# Patient Record
Sex: MALE | Race: WHITE
[De-identification: names, ages, dates, MRNs, and addresses within clinical notes are randomized per-mention and may not be internally consistent; named-entity substitution may affect disease eponyms.]

---

## 2019-08-06 ENCOUNTER — HOSPITAL ENCOUNTER (EMERGENCY)
Dept: HOSPITAL 95 - ER | Age: 54
Discharge: HOME | End: 2019-08-06
Payer: COMMERCIAL

## 2019-08-06 VITALS — WEIGHT: 304 LBS | HEIGHT: 71 IN | BODY MASS INDEX: 42.56 KG/M2

## 2019-08-06 DIAGNOSIS — R07.9: Primary | ICD-10-CM

## 2019-08-06 DIAGNOSIS — Z79.899: ICD-10-CM

## 2019-08-06 DIAGNOSIS — Z79.4: ICD-10-CM

## 2019-08-06 DIAGNOSIS — E11.9: ICD-10-CM

## 2019-08-06 DIAGNOSIS — Z87.891: ICD-10-CM

## 2019-08-06 DIAGNOSIS — Z79.82: ICD-10-CM

## 2019-08-06 LAB
ALBUMIN SERPL BCP-MCNC: 3.6 G/DL
ALBUMIN/GLOB SERPL: 1.1 {RATIO}
ALT SERPL W P-5'-P-CCNC: 41 U/L
ANION GAP SERPL CALCULATED.4IONS-SCNC: 9 MMOL/L
AST SERPL W P-5'-P-CCNC: 23 U/L
BASOPHILS # BLD AUTO: 0.09 K/MM3
BASOPHILS NFR BLD AUTO: 1 %
BILIRUB SERPL-MCNC: 0.7 MG/DL
BUN SERPL-MCNC: 29 MG/DL
CALCIUM SERPL-MCNC: 9.2 MG/DL
CHLORIDE SERPL-SCNC: 108 MMOL/L
CO2 SERPL-SCNC: 22 MMOL/L
CREAT SERPL-MCNC: 1.16 MG/DL
DEPRECATED RDW RBC AUTO: 41.4 FL
EOSINOPHIL # BLD AUTO: 0.36 K/MM3
EOSINOPHIL NFR BLD AUTO: 4 %
ERYTHROCYTE [DISTWIDTH] IN BLOOD BY AUTOMATED COUNT: 13 %
GLOBULIN SER CALC-MCNC: 3.4 G/DL
GLUCOSE SERPL-MCNC: 155 MG/DL
HCT VFR BLD AUTO: 41.2 %
HGB BLD-MCNC: 13.4 G/DL
IMM GRANULOCYTES # BLD AUTO: 0.16 K/MM3
IMM GRANULOCYTES NFR BLD AUTO: 2 %
LYMPHOCYTES # BLD AUTO: 2.57 K/MM3
LYMPHOCYTES NFR BLD AUTO: 28 %
MCHC RBC AUTO-ENTMCNC: 32.5 G/DL
MCV RBC AUTO: 89 FL
MONOCYTES # BLD AUTO: 0.94 K/MM3
MONOCYTES NFR BLD AUTO: 10 %
NEUTROPHILS # BLD AUTO: 5 K/MM3
NEUTROPHILS NFR BLD AUTO: 55 %
NRBC # BLD AUTO: 0 K/MM3
NRBC BLD AUTO-RTO: 0 /100 WBC
PLATELET # BLD AUTO: 228 K/MM3
POTASSIUM SERPL-SCNC: 4.1 MMOL/L
PROT SERPL-MCNC: 7 G/DL
SODIUM SERPL-SCNC: 139 MMOL/L
TROPONIN I SERPL-MCNC: 0.08 NG/ML

## 2020-08-06 ENCOUNTER — HOSPITAL ENCOUNTER (OUTPATIENT)
Dept: HOSPITAL 95 - ER | Age: 55
Setting detail: OBSERVATION
LOS: 2 days | Discharge: HOME | End: 2020-08-08
Attending: HOSPITALIST | Admitting: HOSPITALIST
Payer: COMMERCIAL

## 2020-08-06 VITALS — WEIGHT: 315 LBS | HEIGHT: 70.98 IN | BODY MASS INDEX: 44.1 KG/M2

## 2020-08-06 DIAGNOSIS — G47.33: ICD-10-CM

## 2020-08-06 DIAGNOSIS — Z99.89: ICD-10-CM

## 2020-08-06 DIAGNOSIS — E78.5: ICD-10-CM

## 2020-08-06 DIAGNOSIS — Z79.82: ICD-10-CM

## 2020-08-06 DIAGNOSIS — Z79.4: ICD-10-CM

## 2020-08-06 DIAGNOSIS — Z79.899: ICD-10-CM

## 2020-08-06 DIAGNOSIS — R07.89: Primary | ICD-10-CM

## 2020-08-06 DIAGNOSIS — E66.01: ICD-10-CM

## 2020-08-06 DIAGNOSIS — I25.110: ICD-10-CM

## 2020-08-06 DIAGNOSIS — I12.9: ICD-10-CM

## 2020-08-06 DIAGNOSIS — Z87.891: ICD-10-CM

## 2020-08-06 DIAGNOSIS — E11.22: ICD-10-CM

## 2020-08-06 DIAGNOSIS — N18.9: ICD-10-CM

## 2020-08-06 LAB
ALBUMIN SERPL BCP-MCNC: 3.7 G/DL (ref 3.4–5)
ALBUMIN/GLOB SERPL: 1 {RATIO} (ref 0.8–1.8)
ALT SERPL W P-5'-P-CCNC: 50 U/L (ref 12–78)
ANION GAP SERPL CALCULATED.4IONS-SCNC: 6 MMOL/L (ref 6–16)
AST SERPL W P-5'-P-CCNC: 27 U/L (ref 12–37)
BASOPHILS # BLD AUTO: 0.06 K/MM3 (ref 0–0.23)
BASOPHILS NFR BLD AUTO: 1 % (ref 0–2)
BILIRUB SERPL-MCNC: 0.5 MG/DL (ref 0.1–1)
BUN SERPL-MCNC: 23 MG/DL (ref 8–24)
CALCIUM SERPL-MCNC: 9.1 MG/DL (ref 8.5–10.1)
CHLORIDE SERPL-SCNC: 107 MMOL/L (ref 98–108)
CO2 SERPL-SCNC: 26 MMOL/L (ref 21–32)
CREAT SERPL-MCNC: 1.21 MG/DL (ref 0.6–1.2)
DEPRECATED RDW RBC AUTO: 41.6 FL (ref 35.1–46.3)
EOSINOPHIL # BLD AUTO: 0.29 K/MM3 (ref 0–0.68)
EOSINOPHIL NFR BLD AUTO: 3 % (ref 0–6)
ERYTHROCYTE [DISTWIDTH] IN BLOOD BY AUTOMATED COUNT: 12.8 % (ref 11.7–14.2)
GLOBULIN SER CALC-MCNC: 3.7 G/DL (ref 2.2–4)
GLUCOSE SERPL-MCNC: 259 MG/DL (ref 70–99)
HCT VFR BLD AUTO: 40.8 % (ref 37–53)
HGB BLD-MCNC: 13 G/DL (ref 13.5–17.5)
IMM GRANULOCYTES # BLD AUTO: 0.07 K/MM3 (ref 0–0.1)
IMM GRANULOCYTES NFR BLD AUTO: 1 % (ref 0–1)
LYMPHOCYTES # BLD AUTO: 1.91 K/MM3 (ref 0.84–5.2)
LYMPHOCYTES NFR BLD AUTO: 23 % (ref 21–46)
MCHC RBC AUTO-ENTMCNC: 31.9 G/DL (ref 31.5–36.5)
MCV RBC AUTO: 89 FL (ref 80–100)
MONOCYTES # BLD AUTO: 0.72 K/MM3 (ref 0.16–1.47)
MONOCYTES NFR BLD AUTO: 9 % (ref 4–13)
NEUTROPHILS # BLD AUTO: 5.42 K/MM3 (ref 1.96–9.15)
NEUTROPHILS NFR BLD AUTO: 64 % (ref 41–73)
NRBC # BLD AUTO: 0 K/MM3 (ref 0–0.02)
NRBC BLD AUTO-RTO: 0 /100 WBC (ref 0–0.2)
PLATELET # BLD AUTO: 216 K/MM3 (ref 150–400)
POTASSIUM SERPL-SCNC: 4.5 MMOL/L (ref 3.5–5.5)
PROT SERPL-MCNC: 7.4 G/DL (ref 6.4–8.2)
SODIUM SERPL-SCNC: 139 MMOL/L (ref 136–145)
TROPONIN I SERPL-MCNC: 0.09 NG/ML (ref 0–0.04)

## 2020-08-06 PROCEDURE — A9270 NON-COVERED ITEM OR SERVICE: HCPCS

## 2020-08-06 PROCEDURE — G0378 HOSPITAL OBSERVATION PER HR: HCPCS

## 2020-08-06 PROCEDURE — C1894 INTRO/SHEATH, NON-LASER: HCPCS

## 2020-08-06 PROCEDURE — C1769 GUIDE WIRE: HCPCS

## 2020-08-06 NOTE — NUR
PT REPORTING INITIAL RETURN OF CHEST PAIN/PRESSURE AT 2130. NSR 60 W/PVC.
ROUTINE CARDIAC MEDS ADMININSTERED. STATES PAIN RESOLVING.
CALLED REPORTING
WORSENING CHEST PAIN/PRESSURE AT 2200. /86. SINUS BRADYCARDIC W/RATE IN
THE 50S. NITRO GIVEN X 3 PER ORDERS W/NO IMPROVEMENT IN SYMPTOMS. PT REPORTING
HEADACHE AND FEELING "CRUMMY" IN RESPONSE TO NITRO. CALL TO MD AT 2220. SPOKE
W/DR. ALATORRE. ORDER FOR STAT EKG AND MORPHINE. NOTED THAT NEXT TROPONIN DUE AT
2330.

## 2020-08-07 LAB — PROTHROMBIN TIME: 10.9 SEC (ref 9.7–11.5)

## 2020-08-07 PROCEDURE — 4A023N7 MEASUREMENT OF CARDIAC SAMPLING AND PRESSURE, LEFT HEART, PERCUTANEOUS APPROACH: ICD-10-PCS | Performed by: INTERNAL MEDICINE

## 2020-08-07 PROCEDURE — B201YZZ PLAIN RADIOGRAPHY OF MULTIPLE CORONARY ARTERIES USING OTHER CONTRAST: ICD-10-PCS | Performed by: INTERNAL MEDICINE

## 2020-08-07 NOTE — NUR
AT 0530 PT REPORTING RETURNING CHEST PAIN 6/10 POINTING BELOW L NIPPLE. DENIES
RADIATING PAIN, DENIES SOB, NO DIAPHORESIS. /71. SINUS RONDA 52 PER
TELE MONITOR TECH. MORPHINE GIVEN IV. PT NOW STATES PAIN IS 1/10, TOLERABLE.
APPEARS MORE RELAXED. WILL CONT TO MONITOR.

## 2020-08-07 NOTE — NUR
PATIENT TRANSFER
 
PATIENT TRANSFERED TO [facility] FOR ANGIOGRAM. REPORT GIVEN TO RECEIVING
PCU RN. PATIENT'S LUNCH TRAY DELIVERED TO PCU.

## 2020-08-07 NOTE — NUR
pt tr band off with opsite in place, arm board back on, site is clear,
instructed him to protect it, and keep board in place, wife at bedside, he
complained of a h/a this evening, tylenol given with good relief, no further
changes this shift, call light in  reach.

## 2020-08-07 NOTE — NUR
PT REPORTING HA 8/10. CALL PLACED TO HOSPITALIST. DR. MACDONALD ORDERED TYL 650MG
OT. REMOVE NITRO PATCH IF HA PERSISTS.

## 2020-08-07 NOTE — NUR
CALL TO HOSPITALIST. PT REPORTING RETURNING CHEST PRESSURE/PAIN 4/10.  BP
128/76, PULSE 53. PER TELE MONITOR PT SINUS RONDA 58, DROPPED AS LOW AS 45.
DR. MACDONALD D/C'D METOPROLOL. D/C'D NITRO PILLS, STARTED NITRO PATCH.

## 2020-08-07 NOTE — NUR
TROPONIN INCREASED TO 0.097. CALL PLACED TO HOSPITALIST DR. DELGADO. REPORTED
RESULTS OF STAT EKG. PT STATES CHEST PAIN/PRESSURE NOW 2/10 - DOWN FROM 8/10
AFTER MORPHINE GIVEN. DR. DELGADO ORDERED HEPARIN DRIP AND ADDITIONAL TROPONIN
LEVEL.

## 2020-08-07 NOTE — NUR
RECEIVED CALL FROM TELE MONITOR TECH, PT PULSE AVERAGING 42 FOR 3 MINUTES.
PT AWAKE, ASYMPTOMATIC. STATES HE HAD JUST DOZED OFF. FEELING OK WITHOUT PAIN
AT THIS TIME. CALL PLACED TO HOSPTALIST DR. MACDONALD. NO NEW ORDERS. CONT TO
MONITOR.

## 2020-08-07 NOTE — NUR
PT ARRIVED TO PCU VIA BED FROM [facility] POST ANGIO, RIGHT RADIAL SITE
WITH TRBAND IN PLACE, SITE IS CLEAR, NO SWELLING, OOZING, OR HEMATOMA NOTED,
V.S. STABLE, NO COMPLAITNS, STATES HIS LAST CHEST PAIN WAS LAST NIGHT, NO
INTERVENTION, CALL LIGHT IN REACH.

## 2020-08-08 LAB
ANION GAP SERPL CALCULATED.4IONS-SCNC: 4 MMOL/L (ref 6–16)
BUN SERPL-MCNC: 17 MG/DL (ref 8–24)
CALCIUM SERPL-MCNC: 9.3 MG/DL (ref 8.5–10.1)
CHLORIDE SERPL-SCNC: 105 MMOL/L (ref 98–108)
CO2 SERPL-SCNC: 30 MMOL/L (ref 21–32)
CREAT SERPL-MCNC: 1.02 MG/DL (ref 0.6–1.2)
GLUCOSE SERPL-MCNC: 146 MG/DL (ref 70–99)
POTASSIUM SERPL-SCNC: 4 MMOL/L (ref 3.5–5.5)
PROTHROMBIN TIME: 10.7 SEC (ref 9.7–11.5)
SODIUM SERPL-SCNC: 139 MMOL/L (ref 136–145)

## 2020-08-08 NOTE — NUR
END OF SHIFT SUMMARY
 
NO ACUTE CHANGES THIS SHIFT. VSS. DENIES CP T/O NIGHT. RW WITH OPSITE AND
ARMBOARD UPON SHIFT START. W/OUT ECCHYMOSIS, TENDERNESS, HARDNESS TO SITE.
PULSES STRONG AND PALPABLE DISTAL TO ACCESS SITE. PT FLUCTUATES FROM SR TO SB.
BP STABLE. HAS BEEN ZABRINA TO CHAIR AND BACK TO BED FOR COMFORT. PT STATES BEING
READY TO BE DC'D. WILL CONTINUE TO MONITOR UNTIL SHIFT CHANGE.

## 2020-08-08 NOTE — NUR
ASSUMED CARE
 
RECEIVED REPORT FROM CHASIDY GOTTLIEB. PT SITTING IN CHAIR, ALERT AND ORIENTED TALKING
TO SPOUSE. PT IS INDEPENDENT IN ROOM. CALL LIGHT WITHIN REACH. HE IS ON ROOM
AIR AND DENIES CP, SOB, AND NAUSEA. DOES NOT APPEAR TO BE IN ANY DISCOMFORT.
HIS RIGHT RADIAL SITE LOOKS WNL. DENIES NUMBNESS/TINGLING, CAP REFIL < 3s,
STRONG PULSE, AND PT STATES ONLY A LITTLE TENDER NEAR SITE. BED LOW AND
LOCKED.

## 2020-11-27 ENCOUNTER — HOSPITAL ENCOUNTER (OUTPATIENT)
Dept: HOSPITAL 95 - LAB SHORT | Age: 55
Discharge: HOME | End: 2020-11-27
Attending: PHYSICIAN ASSISTANT
Payer: COMMERCIAL

## 2020-11-27 DIAGNOSIS — Z20.828: Primary | ICD-10-CM

## 2020-11-27 PROCEDURE — U0003 INFECTIOUS AGENT DETECTION BY NUCLEIC ACID (DNA OR RNA); SEVERE ACUTE RESPIRATORY SYNDROME CORONAVIRUS 2 (SARS-COV-2) (CORONAVIRUS DISEASE [COVID-19]), AMPLIFIED PROBE TECHNIQUE, MAKING USE OF HIGH THROUGHPUT TECHNOLOGIES AS DESCRIBED BY CMS-2020-01-R: HCPCS

## 2021-05-26 NOTE — NUR
Retinal detachment warnings given. SHIFT SUMMARY
 
ED ADMIT THIS AFTERNOON. PATIENT DENIES CHEST PAIN, NAUSEA, AND SHORTNESS OF
BREATH. TRENDING TROPONINS. PATIENT UP INDEPENDENT IN ROOM. WIFE AT BEDSIDE.
CALL LIGHT IN REACH.

## 2022-07-14 ENCOUNTER — HOSPITAL ENCOUNTER (EMERGENCY)
Dept: HOSPITAL 95 - ER | Age: 57
Discharge: HOME | End: 2022-07-14
Payer: COMMERCIAL

## 2022-07-14 VITALS — WEIGHT: 315 LBS | BODY MASS INDEX: 44.1 KG/M2 | HEIGHT: 71 IN

## 2022-07-14 DIAGNOSIS — Z87.891: ICD-10-CM

## 2022-07-14 DIAGNOSIS — Z79.899: ICD-10-CM

## 2022-07-14 DIAGNOSIS — R77.8: ICD-10-CM

## 2022-07-14 DIAGNOSIS — I12.9: ICD-10-CM

## 2022-07-14 DIAGNOSIS — R07.89: Primary | ICD-10-CM

## 2022-07-14 DIAGNOSIS — E11.22: ICD-10-CM

## 2022-07-14 DIAGNOSIS — N18.9: ICD-10-CM

## 2022-07-14 DIAGNOSIS — G47.33: ICD-10-CM

## 2022-07-14 DIAGNOSIS — E66.01: ICD-10-CM

## 2022-07-14 DIAGNOSIS — E78.5: ICD-10-CM

## 2022-07-14 LAB
ALBUMIN SERPL BCP-MCNC: 3.8 G/DL (ref 3.4–5)
ALBUMIN/GLOB SERPL: 1.2 {RATIO} (ref 0.8–1.8)
ALT SERPL W P-5'-P-CCNC: 39 U/L (ref 12–78)
ANION GAP SERPL CALCULATED.4IONS-SCNC: 9 MMOL/L (ref 6–16)
AST SERPL W P-5'-P-CCNC: 23 U/L (ref 12–37)
BASOPHILS # BLD AUTO: 0.08 K/MM3 (ref 0–0.23)
BASOPHILS NFR BLD AUTO: 1 % (ref 0–2)
BILIRUB SERPL-MCNC: 0.5 MG/DL (ref 0.1–1)
BUN SERPL-MCNC: 27 MG/DL (ref 8–24)
CALCIUM SERPL-MCNC: 9.7 MG/DL (ref 8.5–10.1)
CHLORIDE SERPL-SCNC: 109 MMOL/L (ref 98–108)
CO2 SERPL-SCNC: 21 MMOL/L (ref 21–32)
CREAT SERPL-MCNC: 1.06 MG/DL (ref 0.6–1.2)
DEPRECATED RDW RBC AUTO: 46.1 FL (ref 35.1–46.3)
EOSINOPHIL # BLD AUTO: 0.23 K/MM3 (ref 0–0.68)
EOSINOPHIL NFR BLD AUTO: 3 % (ref 0–6)
ERYTHROCYTE [DISTWIDTH] IN BLOOD BY AUTOMATED COUNT: 14.3 % (ref 11.7–14.2)
GLOBULIN SER CALC-MCNC: 3.3 G/DL (ref 2.2–4)
GLUCOSE SERPL-MCNC: 84 MG/DL (ref 70–99)
HCT VFR BLD AUTO: 36.8 % (ref 37–53)
HGB BLD-MCNC: 11.5 G/DL (ref 13.5–17.5)
IMM GRANULOCYTES # BLD AUTO: 0.06 K/MM3 (ref 0–0.1)
IMM GRANULOCYTES NFR BLD AUTO: 1 % (ref 0–1)
LYMPHOCYTES # BLD AUTO: 1.76 K/MM3 (ref 0.84–5.2)
LYMPHOCYTES NFR BLD AUTO: 20 % (ref 21–46)
MCHC RBC AUTO-ENTMCNC: 31.3 G/DL (ref 31.5–36.5)
MCV RBC AUTO: 88 FL (ref 80–100)
MONOCYTES # BLD AUTO: 0.72 K/MM3 (ref 0.16–1.47)
MONOCYTES NFR BLD AUTO: 8 % (ref 4–13)
NEUTROPHILS # BLD AUTO: 5.95 K/MM3 (ref 1.96–9.15)
NEUTROPHILS NFR BLD AUTO: 68 % (ref 41–73)
NRBC # BLD AUTO: 0 K/MM3 (ref 0–0.02)
NRBC BLD AUTO-RTO: 0 /100 WBC (ref 0–0.2)
PLATELET # BLD AUTO: 207 K/MM3 (ref 150–400)
POTASSIUM SERPL-SCNC: 4.7 MMOL/L (ref 3.5–5.5)
PROT SERPL-MCNC: 7.1 G/DL (ref 6.4–8.2)
SODIUM SERPL-SCNC: 139 MMOL/L (ref 136–145)

## 2025-05-09 ENCOUNTER — HOSPITAL ENCOUNTER (EMERGENCY)
Dept: HOSPITAL 95 - ER | Age: 60
Discharge: HOME | End: 2025-05-09
Payer: COMMERCIAL

## 2025-05-09 ENCOUNTER — HOSPITAL ENCOUNTER (INPATIENT)
Dept: HOSPITAL 95 - ER | Age: 60
LOS: 4 days | Discharge: HOME | DRG: 309 | End: 2025-05-13
Attending: HOSPITALIST | Admitting: HOSPITALIST
Payer: COMMERCIAL

## 2025-05-09 VITALS — SYSTOLIC BLOOD PRESSURE: 123 MMHG | DIASTOLIC BLOOD PRESSURE: 60 MMHG

## 2025-05-09 VITALS — SYSTOLIC BLOOD PRESSURE: 138 MMHG | DIASTOLIC BLOOD PRESSURE: 73 MMHG

## 2025-05-09 VITALS — SYSTOLIC BLOOD PRESSURE: 103 MMHG | DIASTOLIC BLOOD PRESSURE: 80 MMHG

## 2025-05-09 VITALS — DIASTOLIC BLOOD PRESSURE: 94 MMHG | SYSTOLIC BLOOD PRESSURE: 145 MMHG

## 2025-05-09 VITALS — DIASTOLIC BLOOD PRESSURE: 77 MMHG | SYSTOLIC BLOOD PRESSURE: 144 MMHG

## 2025-05-09 DIAGNOSIS — Z79.82: ICD-10-CM

## 2025-05-09 DIAGNOSIS — Z79.01: ICD-10-CM

## 2025-05-09 DIAGNOSIS — R79.89: ICD-10-CM

## 2025-05-09 DIAGNOSIS — N18.9: ICD-10-CM

## 2025-05-09 DIAGNOSIS — F51.04: ICD-10-CM

## 2025-05-09 DIAGNOSIS — I48.4: Primary | ICD-10-CM

## 2025-05-09 DIAGNOSIS — E78.5: ICD-10-CM

## 2025-05-09 DIAGNOSIS — Z79.84: ICD-10-CM

## 2025-05-09 DIAGNOSIS — G47.33: ICD-10-CM

## 2025-05-09 DIAGNOSIS — E11.22: ICD-10-CM

## 2025-05-09 DIAGNOSIS — E66.813: ICD-10-CM

## 2025-05-09 DIAGNOSIS — Z87.891: ICD-10-CM

## 2025-05-09 DIAGNOSIS — Z79.899: ICD-10-CM

## 2025-05-09 DIAGNOSIS — I12.9: ICD-10-CM

## 2025-05-09 DIAGNOSIS — Z79.4: ICD-10-CM

## 2025-05-09 DIAGNOSIS — Z98.1: ICD-10-CM

## 2025-05-09 DIAGNOSIS — I48.20: ICD-10-CM

## 2025-05-09 DIAGNOSIS — I25.10: ICD-10-CM

## 2025-05-09 DIAGNOSIS — I48.91: Primary | ICD-10-CM

## 2025-05-09 LAB
MAGNESIUM SERPL-MCNC: 1.5 MG/DL (ref 1.6–2.4)
TSH SERPL DL<=0.005 MIU/L-ACNC: 2.94 UIU/ML (ref 0.36–4.8)

## 2025-05-09 PROCEDURE — C1751 CATH, INF, PER/CENT/MIDLINE: HCPCS

## 2025-05-09 PROCEDURE — A9270 NON-COVERED ITEM OR SERVICE: HCPCS

## 2025-05-10 VITALS — DIASTOLIC BLOOD PRESSURE: 105 MMHG | SYSTOLIC BLOOD PRESSURE: 153 MMHG

## 2025-05-10 VITALS — DIASTOLIC BLOOD PRESSURE: 86 MMHG | SYSTOLIC BLOOD PRESSURE: 121 MMHG

## 2025-05-10 VITALS — SYSTOLIC BLOOD PRESSURE: 138 MMHG | DIASTOLIC BLOOD PRESSURE: 89 MMHG

## 2025-05-10 VITALS — DIASTOLIC BLOOD PRESSURE: 140 MMHG | SYSTOLIC BLOOD PRESSURE: 155 MMHG

## 2025-05-10 VITALS — SYSTOLIC BLOOD PRESSURE: 122 MMHG | DIASTOLIC BLOOD PRESSURE: 74 MMHG

## 2025-05-10 VITALS — DIASTOLIC BLOOD PRESSURE: 100 MMHG | SYSTOLIC BLOOD PRESSURE: 138 MMHG

## 2025-05-10 VITALS — SYSTOLIC BLOOD PRESSURE: 131 MMHG | DIASTOLIC BLOOD PRESSURE: 111 MMHG

## 2025-05-10 VITALS — SYSTOLIC BLOOD PRESSURE: 125 MMHG | DIASTOLIC BLOOD PRESSURE: 74 MMHG

## 2025-05-10 VITALS — SYSTOLIC BLOOD PRESSURE: 123 MMHG | DIASTOLIC BLOOD PRESSURE: 93 MMHG

## 2025-05-10 VITALS — SYSTOLIC BLOOD PRESSURE: 136 MMHG | DIASTOLIC BLOOD PRESSURE: 107 MMHG

## 2025-05-10 VITALS — DIASTOLIC BLOOD PRESSURE: 111 MMHG | SYSTOLIC BLOOD PRESSURE: 144 MMHG

## 2025-05-10 VITALS — DIASTOLIC BLOOD PRESSURE: 110 MMHG | SYSTOLIC BLOOD PRESSURE: 131 MMHG

## 2025-05-10 VITALS — SYSTOLIC BLOOD PRESSURE: 125 MMHG | DIASTOLIC BLOOD PRESSURE: 107 MMHG

## 2025-05-10 LAB
CALCIUM SERPL-MCNC: 9.3 MG/DL (ref 8.5–10.1)
CREAT SERPL-MCNC: 1.02 MG/DL (ref 0.6–1.2)
CREAT SERPL-MCNC: 1.06 MG/DL (ref 0.6–1.2)
MAGNESIUM SERPL-MCNC: 1.8 MG/DL (ref 1.6–2.4)
POTASSIUM SERPL-SCNC: 3.9 MMOL/L (ref 3.5–5.5)
POTASSIUM SERPL-SCNC: 4.1 MMOL/L (ref 3.5–5.5)

## 2025-05-11 VITALS — SYSTOLIC BLOOD PRESSURE: 113 MMHG | DIASTOLIC BLOOD PRESSURE: 88 MMHG

## 2025-05-11 VITALS — SYSTOLIC BLOOD PRESSURE: 114 MMHG | DIASTOLIC BLOOD PRESSURE: 84 MMHG

## 2025-05-11 VITALS — DIASTOLIC BLOOD PRESSURE: 78 MMHG | SYSTOLIC BLOOD PRESSURE: 124 MMHG

## 2025-05-11 VITALS — SYSTOLIC BLOOD PRESSURE: 96 MMHG | DIASTOLIC BLOOD PRESSURE: 68 MMHG

## 2025-05-11 VITALS — SYSTOLIC BLOOD PRESSURE: 131 MMHG | DIASTOLIC BLOOD PRESSURE: 89 MMHG

## 2025-05-11 VITALS — SYSTOLIC BLOOD PRESSURE: 108 MMHG | DIASTOLIC BLOOD PRESSURE: 84 MMHG

## 2025-05-11 VITALS — DIASTOLIC BLOOD PRESSURE: 125 MMHG | SYSTOLIC BLOOD PRESSURE: 146 MMHG

## 2025-05-11 VITALS — SYSTOLIC BLOOD PRESSURE: 114 MMHG | DIASTOLIC BLOOD PRESSURE: 97 MMHG

## 2025-05-11 VITALS — SYSTOLIC BLOOD PRESSURE: 114 MMHG | DIASTOLIC BLOOD PRESSURE: 89 MMHG

## 2025-05-11 LAB
BASOPHILS # BLD AUTO: 0.12 K/MM3 (ref 0–0.23)
BASOPHILS NFR BLD AUTO: 1 % (ref 0–2)
CALCIUM SERPL-MCNC: 9.5 MG/DL (ref 8.5–10.1)
CREAT SERPL-MCNC: 1.19 MG/DL (ref 0.6–1.2)
DEPRECATED RDW RBC AUTO: 47.3 FL (ref 35.1–46.3)
EOSINOPHIL # BLD AUTO: 0.43 K/MM3 (ref 0–0.68)
EOSINOPHIL NFR BLD AUTO: 3 % (ref 0–6)
ERYTHROCYTE [DISTWIDTH] IN BLOOD BY AUTOMATED COUNT: 14.9 % (ref 11.7–14.2)
HCT VFR BLD AUTO: 47.8 % (ref 37–53)
HGB BLD-MCNC: 15.3 G/DL (ref 13.5–17.5)
IMM GRANULOCYTES # BLD AUTO: 0.09 K/MM3 (ref 0–0.1)
IMM GRANULOCYTES NFR BLD AUTO: 1 % (ref 0–1)
LYMPHOCYTES # BLD AUTO: 3.23 K/MM3 (ref 0.84–5.2)
LYMPHOCYTES NFR BLD AUTO: 24 % (ref 21–46)
MCV RBC AUTO: 87 FL (ref 80–100)
MONOCYTES # BLD AUTO: 1.37 K/MM3 (ref 0.16–1.47)
MONOCYTES NFR BLD AUTO: 10 % (ref 4–13)
NEUTROPHILS # BLD AUTO: 8.19 K/MM3 (ref 1.96–9.15)
NEUTROPHILS NFR BLD AUTO: 61 % (ref 41–73)
PLATELET # BLD AUTO: 321 K/MM3 (ref 150–400)
POTASSIUM SERPL-SCNC: 3.8 MMOL/L (ref 3.5–5.5)

## 2025-05-12 VITALS — SYSTOLIC BLOOD PRESSURE: 97 MMHG | DIASTOLIC BLOOD PRESSURE: 81 MMHG

## 2025-05-12 VITALS — SYSTOLIC BLOOD PRESSURE: 129 MMHG | DIASTOLIC BLOOD PRESSURE: 91 MMHG

## 2025-05-12 VITALS — DIASTOLIC BLOOD PRESSURE: 103 MMHG | SYSTOLIC BLOOD PRESSURE: 113 MMHG

## 2025-05-12 VITALS — DIASTOLIC BLOOD PRESSURE: 75 MMHG | SYSTOLIC BLOOD PRESSURE: 106 MMHG

## 2025-05-12 VITALS — SYSTOLIC BLOOD PRESSURE: 143 MMHG | DIASTOLIC BLOOD PRESSURE: 95 MMHG

## 2025-05-12 VITALS — DIASTOLIC BLOOD PRESSURE: 93 MMHG | SYSTOLIC BLOOD PRESSURE: 105 MMHG

## 2025-05-12 VITALS — DIASTOLIC BLOOD PRESSURE: 99 MMHG | SYSTOLIC BLOOD PRESSURE: 131 MMHG

## 2025-05-12 VITALS — DIASTOLIC BLOOD PRESSURE: 104 MMHG | SYSTOLIC BLOOD PRESSURE: 143 MMHG

## 2025-05-12 VITALS — DIASTOLIC BLOOD PRESSURE: 86 MMHG | SYSTOLIC BLOOD PRESSURE: 139 MMHG

## 2025-05-12 VITALS — SYSTOLIC BLOOD PRESSURE: 109 MMHG | DIASTOLIC BLOOD PRESSURE: 80 MMHG

## 2025-05-12 LAB
CALCIUM SERPL-MCNC: 9.6 MG/DL (ref 8.5–10.1)
CREAT SERPL-MCNC: 1.18 MG/DL (ref 0.6–1.2)
MAGNESIUM SERPL-MCNC: 1.9 MG/DL (ref 1.6–2.4)
POTASSIUM SERPL-SCNC: 4.3 MMOL/L (ref 3.5–5.5)

## 2025-05-13 VITALS — SYSTOLIC BLOOD PRESSURE: 138 MMHG | DIASTOLIC BLOOD PRESSURE: 85 MMHG

## 2025-05-13 VITALS — SYSTOLIC BLOOD PRESSURE: 114 MMHG | DIASTOLIC BLOOD PRESSURE: 71 MMHG

## 2025-05-13 VITALS — SYSTOLIC BLOOD PRESSURE: 104 MMHG | DIASTOLIC BLOOD PRESSURE: 70 MMHG

## 2025-05-13 VITALS — SYSTOLIC BLOOD PRESSURE: 113 MMHG | DIASTOLIC BLOOD PRESSURE: 87 MMHG

## 2025-05-13 VITALS — DIASTOLIC BLOOD PRESSURE: 67 MMHG | SYSTOLIC BLOOD PRESSURE: 115 MMHG

## 2025-05-13 VITALS — DIASTOLIC BLOOD PRESSURE: 96 MMHG | SYSTOLIC BLOOD PRESSURE: 123 MMHG

## 2025-05-13 VITALS — DIASTOLIC BLOOD PRESSURE: 84 MMHG | SYSTOLIC BLOOD PRESSURE: 134 MMHG

## 2025-05-13 VITALS — SYSTOLIC BLOOD PRESSURE: 135 MMHG | DIASTOLIC BLOOD PRESSURE: 94 MMHG

## 2025-05-13 VITALS — DIASTOLIC BLOOD PRESSURE: 65 MMHG | SYSTOLIC BLOOD PRESSURE: 105 MMHG

## 2025-05-13 VITALS — DIASTOLIC BLOOD PRESSURE: 77 MMHG | SYSTOLIC BLOOD PRESSURE: 123 MMHG

## 2025-05-13 VITALS — SYSTOLIC BLOOD PRESSURE: 118 MMHG | DIASTOLIC BLOOD PRESSURE: 80 MMHG

## 2025-05-13 LAB
ALBUMIN SERPL BCP-MCNC: 3.6 G/DL (ref 3.4–5)
ALBUMIN/GLOB SERPL: 1.1 {RATIO} (ref 0.8–1.8)
BASOPHILS NFR BLD AUTO: 1 % (ref 0–2)
BILIRUB SERPL-MCNC: 0.7 MG/DL (ref 0.1–1)
CALCIUM SERPL-MCNC: 8.8 MG/DL (ref 8.5–10.1)
CREAT SERPL-MCNC: 1.25 MG/DL (ref 0.6–1.2)
EOSINOPHIL # BLD AUTO: 0.33 K/MM3 (ref 0–0.68)
EOSINOPHIL NFR BLD AUTO: 3 % (ref 0–6)
ERYTHROCYTE [DISTWIDTH] IN BLOOD BY AUTOMATED COUNT: 14.8 % (ref 11.7–14.2)
GLOBULIN SER CALC-MCNC: 3.4 G/DL (ref 2.2–4)
HCT VFR BLD AUTO: 46.1 % (ref 37–53)
IMM GRANULOCYTES # BLD AUTO: 0.06 K/MM3 (ref 0–0.1)
IMM GRANULOCYTES NFR BLD AUTO: 1 % (ref 0–1)
LYMPHOCYTES # BLD AUTO: 3.03 K/MM3 (ref 0.84–5.2)
LYMPHOCYTES NFR BLD AUTO: 24 % (ref 21–46)
MAGNESIUM SERPL-MCNC: 2.2 MG/DL (ref 1.6–2.4)
MCHC RBC AUTO-ENTMCNC: 32.5 G/DL (ref 31.5–36.5)
MCV RBC AUTO: 85 FL (ref 80–100)
MONOCYTES # BLD AUTO: 1.42 K/MM3 (ref 0.16–1.47)
MONOCYTES NFR BLD AUTO: 11 % (ref 4–13)
NEUTROPHILS # BLD AUTO: 7.93 K/MM3 (ref 1.96–9.15)
NEUTROPHILS NFR BLD AUTO: 62 % (ref 41–73)
PLATELET # BLD AUTO: 304 K/MM3 (ref 150–400)
POTASSIUM SERPL-SCNC: 3.8 MMOL/L (ref 3.5–5.5)

## 2025-05-13 PROCEDURE — B24BZZ4 ULTRASONOGRAPHY OF HEART WITH AORTA, TRANSESOPHAGEAL: ICD-10-PCS | Performed by: STUDENT IN AN ORGANIZED HEALTH CARE EDUCATION/TRAINING PROGRAM

## 2025-05-13 PROCEDURE — 5A2204Z RESTORATION OF CARDIAC RHYTHM, SINGLE: ICD-10-PCS | Performed by: STUDENT IN AN ORGANIZED HEALTH CARE EDUCATION/TRAINING PROGRAM

## 2025-07-21 ENCOUNTER — HOSPITAL ENCOUNTER (OUTPATIENT)
Dept: HOSPITAL 95 - MHTC | Age: 60
Discharge: HOME | End: 2025-07-21
Payer: COMMERCIAL

## 2025-07-21 VITALS — DIASTOLIC BLOOD PRESSURE: 81 MMHG | SYSTOLIC BLOOD PRESSURE: 122 MMHG

## 2025-07-21 VITALS — DIASTOLIC BLOOD PRESSURE: 99 MMHG | SYSTOLIC BLOOD PRESSURE: 129 MMHG

## 2025-07-21 VITALS — SYSTOLIC BLOOD PRESSURE: 144 MMHG | DIASTOLIC BLOOD PRESSURE: 80 MMHG

## 2025-07-21 VITALS — DIASTOLIC BLOOD PRESSURE: 86 MMHG | SYSTOLIC BLOOD PRESSURE: 131 MMHG

## 2025-07-21 VITALS — DIASTOLIC BLOOD PRESSURE: 86 MMHG | SYSTOLIC BLOOD PRESSURE: 122 MMHG

## 2025-07-21 VITALS — SYSTOLIC BLOOD PRESSURE: 121 MMHG | DIASTOLIC BLOOD PRESSURE: 74 MMHG

## 2025-07-21 VITALS — BODY MASS INDEX: 42.39 KG/M2 | HEIGHT: 70 IN | WEIGHT: 296.08 LBS

## 2025-07-21 DIAGNOSIS — I44.7: ICD-10-CM

## 2025-07-21 DIAGNOSIS — Z79.84: ICD-10-CM

## 2025-07-21 DIAGNOSIS — Z87.891: ICD-10-CM

## 2025-07-21 DIAGNOSIS — Z79.01: ICD-10-CM

## 2025-07-21 DIAGNOSIS — I10: ICD-10-CM

## 2025-07-21 DIAGNOSIS — Z88.5: ICD-10-CM

## 2025-07-21 DIAGNOSIS — Z79.899: ICD-10-CM

## 2025-07-21 DIAGNOSIS — E78.00: ICD-10-CM

## 2025-07-21 DIAGNOSIS — E11.9: ICD-10-CM

## 2025-07-21 DIAGNOSIS — E66.9: ICD-10-CM

## 2025-07-21 DIAGNOSIS — Z79.82: ICD-10-CM

## 2025-07-21 DIAGNOSIS — I44.0: ICD-10-CM

## 2025-07-21 DIAGNOSIS — Z79.4: ICD-10-CM

## 2025-07-21 DIAGNOSIS — G47.33: ICD-10-CM

## 2025-07-21 DIAGNOSIS — Z79.85: ICD-10-CM

## 2025-07-21 DIAGNOSIS — I48.92: ICD-10-CM

## 2025-07-21 DIAGNOSIS — I25.118: Primary | ICD-10-CM

## 2025-07-21 PROCEDURE — C1887 CATHETER, GUIDING: HCPCS

## 2025-07-21 PROCEDURE — C1874 STENT, COATED/COV W/DEL SYS: HCPCS

## 2025-07-21 PROCEDURE — C1769 GUIDE WIRE: HCPCS

## 2025-07-21 PROCEDURE — A9270 NON-COVERED ITEM OR SERVICE: HCPCS

## 2025-07-21 PROCEDURE — C1894 INTRO/SHEATH, NON-LASER: HCPCS

## 2025-07-21 PROCEDURE — C9600 PERC DRUG-EL COR STENT SING: HCPCS

## 2025-07-21 PROCEDURE — C1725 CATH, TRANSLUMIN NON-LASER: HCPCS
